# Patient Record
Sex: MALE | Race: WHITE | Employment: OTHER | URBAN - METROPOLITAN AREA
[De-identification: names, ages, dates, MRNs, and addresses within clinical notes are randomized per-mention and may not be internally consistent; named-entity substitution may affect disease eponyms.]

---

## 2018-09-26 ENCOUNTER — OFFICE VISIT (OUTPATIENT)
Dept: FAMILY MEDICINE CLINIC | Age: 53
End: 2018-09-26

## 2018-09-26 VITALS
RESPIRATION RATE: 18 BRPM | DIASTOLIC BLOOD PRESSURE: 78 MMHG | SYSTOLIC BLOOD PRESSURE: 116 MMHG | OXYGEN SATURATION: 97 % | HEART RATE: 79 BPM | TEMPERATURE: 98.2 F | HEIGHT: 75 IN | BODY MASS INDEX: 31.08 KG/M2 | WEIGHT: 250 LBS

## 2018-09-26 DIAGNOSIS — L30.9 DERMATITIS: Primary | ICD-10-CM

## 2018-09-26 PROBLEM — M54.50 LOWER BACK PAIN: Status: ACTIVE | Noted: 2018-09-26

## 2018-09-26 RX ORDER — TRIAMCINOLONE ACETONIDE 1 MG/G
CREAM TOPICAL 3 TIMES DAILY
Qty: 60 G | Refills: 1 | Status: SHIPPED | OUTPATIENT
Start: 2018-09-26 | End: 2019-12-23

## 2018-09-26 RX ORDER — DICLOFENAC SODIUM 75 MG/1
TABLET, DELAYED RELEASE ORAL
COMMUNITY
Start: 2018-08-25 | End: 2019-12-23

## 2018-09-26 NOTE — PROGRESS NOTES
9/26/2018   Chief Complaint   Patient presents with    Rash     legs, arms, and head. Patient states that it itches continuously       HPI:  Omie Apley is a 52 y.o. male who presents to clinic today for rash. Symptoms began 3 weeks ago and duration has been constant. The patient characterizes pain as itching. Associated symptoms include nothing. Aggravating factors include nothing. Patient has tried benadryl cream for relief with some success intially, now not touching the itchiness. Hx of something similar a few years ago, saw a dermatologist who gave a steroid cream  Which worked very well (trimacinolone)    Patients past medical, surgical and family histories were reviewed. Allergies and Medications reviewed and updated. Review of Systems -   General ROS: negative for - chills or fever  Respiratory ROS: negative for - cough, shortness of breath or wheezing  Cardiovascular ROS: negative for - chest pain or palpitations  Gastrointestinal ROS: negative for - abdominal pain, constipation, diarrhea, nausea, vomiting  Neurological ROS: negative for - dizziness or headaches  Dermatological ROS: negative for - rash or skin lesion changes      Vitals:  Vitals:    09/26/18 1114   BP: 116/78   Pulse: 79   Resp: 18   Temp: 98.2 °F (36.8 °C)   TempSrc: Oral   SpO2: 97%   Weight: 250 lb (113.4 kg)   Height: 6' 3\" (1.905 m)     Body mass index is 31.25 kg/(m^2). Objective  General: Patient alert and oriented and in NAD  HEENT: PER/EOMI, no conjunctival pallor or scleral icterus. No thyromegaly or cervical lymphadenopathy  Heart: Regular rate and rhythm, No murmurs, rubs or gallops.    Lungs: Clear to auscultation bilaterally, no wheezing, rales or rhonchi  Abd: +BS, non-tender, non-distended  Ext: No edema  Skin: Diffuse maculopapular rash, with occasional pustule snote don anterior and posterior thighs b/l, upper buttocks, and scalp  Neuro: AAOx3  Psych: Appropriate mood and affect    No results found for this or any previous visit (from the past 24 hour(s)). Assessment and Plan:  1. Dermatitis    Likley contact or irritant dermatitis, will treat with steroid cream, if no improvement in 1-2 weeks, patient should call office. Advised on avoiding use of steroid cream on face    - triamcinolone acetonide (KENALOG) 0.1 % topical cream; Apply  to affected area three (3) times daily. use thin layer  Dispense: 60 g; Refill: 1    I have discussed the diagnosis with the patient and the intended plan as seen in the above orders. he has expressed understanding. The patient has received an after-visit summary and questions were answered concerning future plans. I have discussed medication side effects and warnings with the patient as well.     Follow-up Disposition: Not on File    Electronically Signed: Mendel Frisk, MD

## 2018-09-27 NOTE — PROGRESS NOTES
I have reviewed the notes, assessments, and/or procedures performed by Dr. Cullen Elliott, I concur with her/his documentation of Ciara Peres.

## 2019-12-20 NOTE — PROGRESS NOTES
Jake Patrick  62 y.o. male  1965  [id number]    [facility]  Progress Note     Encounter Date: 12/23/2019    Assessment and Plan:     Encounter Diagnoses     ICD-10-CM ICD-9-CM   1. Greater trochanteric bursitis of left hip M70.62 726.5       1. Greater trochanteric bursitis of left hip  Advised NSAIDs and exercises. If not improvement in 2 weeks, RTC for possible bursa injection. - meloxicam (MOBIC) 15 mg tablet; Take 1 Tab by mouth daily. Dispense: 30 Tab; Refill: 1      I have discussed the diagnosis with the patient and the intended plan as seen in the above orders. he has expressed understanding. The patient has received an after-visit summary and questions were answered concerning future plans. I have discussed medication side effects and warnings with the patient as well. Electronically Signed: Lizbet Russell MD    Current Medications after this visit     Current Outpatient Medications   Medication Sig    meloxicam (MOBIC) 15 mg tablet Take 1 Tab by mouth daily. No current facility-administered medications for this visit. Medications Discontinued During This Encounter   Medication Reason    diclofenac EC (VOLTAREN) 75 mg EC tablet Not A Current Medication    triamcinolone acetonide (KENALOG) 0.1 % topical cream Not A Current Medication    naproxen (NAPROSYN) 500 mg tablet Not A Current Medication    ibuprofen (ADVIL) 200 mg tablet Not A Current Medication     ~~~~~~~~~~~~~~~~~~~~~~~~~~~~~~~~~~~~~~~~~~~~~~    Chief Complaint   Patient presents with    Hip Pain     Pt states that for x2 mos; Left hip has been ongoing       History provided by patient  History of Present Illness   Jake Patrick is a 62 y.o. male who presents to clinic today for:    Left hip pain  Patient present with cc of left hip pain > 2 months. Pain is on the side of the left thigh radiating downwards.  Associated with sitting for long periods and getting in and out of his care. Patient reports that he had gone to [facility] in October for the pain and had been prescribed a steroid pack. He had good response initially. Has been using advil or motrin with good response. Health Maintenance  Health Maintenance Due   Topic Date Due    Hepatitis C Screening  1965    Pneumococcal 0-64 years (1 of 1 - PPSV23) 04/10/1969    DTaP/Tdap/Td series (1 - Tdap) 04/10/1974    Shingrix Vaccine Age 50> (1 of 2) 04/10/2013    FOBT Q 1 YEAR AGE 50-75  04/10/2013     Review of Systems   Review of Systems   Constitutional:        See HPI for pertinent review of systems       Vitals/Objective:     Vitals:    12/23/19 1436   BP: 127/87   Pulse: 78   Resp: 16   Temp: 97.8 °F (36.6 °C)   TempSrc: Oral   SpO2: 97%   Weight: 256 lb (116.1 kg)   Height: 6' 3\" (1.905 m)     Body mass index is 32 kg/m². Wt Readings from Last 3 Encounters:   12/23/19 256 lb (116.1 kg)   09/26/18 250 lb (113.4 kg)   04/15/11 225 lb (102.1 kg)       Physical Exam  Constitutional:       General: He is not in acute distress. Appearance: Normal appearance. He is well-developed. He is not diaphoretic. HENT:      Head: Normocephalic and atraumatic. Right Ear: External ear normal.      Left Ear: External ear normal.      Mouth/Throat:      Pharynx: No oropharyngeal exudate or posterior oropharyngeal erythema. Eyes:      General:         Right eye: No discharge. Left eye: No discharge. Conjunctiva/sclera: Conjunctivae normal.   Cardiovascular:      Rate and Rhythm: Normal rate and regular rhythm. Heart sounds: S1 normal and S2 normal. No murmur. Pulmonary:      Effort: Pulmonary effort is normal.      Breath sounds: Normal breath sounds. No rales. Musculoskeletal:      Right lower leg: No edema. Left lower leg: No edema. Skin:     General: Skin is warm and dry. Neurological:      Mental Status: He is alert and oriented to person, place, and time.       MSK - Hip left: Deformity: None    ROM:     Flexion: Normal    Extension: Normal     Internal/external rotation: Normal      Gait: Normal       Palpation:    L1-L5: No tenderness    Sacrum: No tenderness    Coccyx: No tenderness    Left Paraspinal: No tenderness    Right Paraspinal: No tenderness    Greater trochanter: + tenderness    Ischial Tuberosity: No tenderness    Piriformis: No tenderness       Strength (0-5/5)   Hip Flexion:  Left: 5/5 Right: 5/5   Hip Extension: Left: 5/5 Right: 5/5   Hip Abduction: Left: 5/5 Right: 5/5   Hip Adduction: Left: 5/5 Right: 5/5   Knee Extension: Left: 5/5 Right: 5/5   Knee Flexion:  Left: 5/5 Right: 5/5        Sensation: Intact, no deficits      DTR:   Patella: Left: +2 Right: +2   Achilles:  Left: +2 Right: +2     Special test:   Straight leg: Left: Negative  Right: Negative   Ariannas: Left: Negative  Right: Negative   Piriformis: Left: Positive  Right: Positive          No results found for this or any previous visit (from the past 24 hour(s)). Disposition     Follow-up and Dispositions  ·   Return if symptoms worsen or fail to improve. No future appointments. History   Patient's past medical, surgical and family histories were reviewed and updated.     Past Medical History:   Diagnosis Date    H/O hernia repair     Keratoacanthoma      Past Surgical History:   Procedure Laterality Date    HX HERNIA REPAIR      HX ORTHOPAEDIC      back surgery for ruptured disc at age 19     Family History   Problem Relation Age of Onset    Breast Cancer Mother     Coronary Artery Disease Father     Heart Attack Father      Social History     Tobacco Use    Smoking status: Current Every Day Smoker     Packs/day: 1.00     Years: 15.00     Pack years: 15.00     Types: Cigarettes    Smokeless tobacco: Never Used   Substance Use Topics    Alcohol use: No     Alcohol/week: 5.0 standard drinks     Types: 6 Cans of beer per week     Comment: only on weekends    Drug use: Yes     Types: Marijuana       Allergies     Allergies   Allergen Reactions    Dilaudid [Hydromorphone] Unknown (comments)     None noted

## 2019-12-23 ENCOUNTER — OFFICE VISIT (OUTPATIENT)
Dept: FAMILY MEDICINE CLINIC | Age: 54
End: 2019-12-23

## 2019-12-23 VITALS
BODY MASS INDEX: 31.83 KG/M2 | OXYGEN SATURATION: 97 % | TEMPERATURE: 97.8 F | SYSTOLIC BLOOD PRESSURE: 127 MMHG | HEART RATE: 78 BPM | HEIGHT: 75 IN | DIASTOLIC BLOOD PRESSURE: 87 MMHG | RESPIRATION RATE: 16 BRPM | WEIGHT: 256 LBS

## 2019-12-23 DIAGNOSIS — M70.62 GREATER TROCHANTERIC BURSITIS OF LEFT HIP: Primary | ICD-10-CM

## 2019-12-23 RX ORDER — MELOXICAM 15 MG/1
15 TABLET ORAL DAILY
Qty: 30 TAB | Refills: 1 | Status: SHIPPED | OUTPATIENT
Start: 2019-12-23

## 2019-12-23 RX ORDER — IBUPROFEN 200 MG
TABLET ORAL
COMMUNITY
End: 2019-12-23

## 2019-12-23 NOTE — PROGRESS NOTES
Identified pt with two pt identifiers(name and ). Reviewed record in preparation for visit and have obtained necessary documentation. Chief Complaint   Patient presents with    Hip Pain     Pt states that for x2 mos; Left hip has been ongoing        Health Maintenance Due   Topic    Hepatitis C Screening     Pneumococcal 0-64 years (1 of 1 - PPSV23)    DTaP/Tdap/Td series (1 - Tdap)    Shingrix Vaccine Age 50> (1 of 2)    FOBT Q 1 YEAR AGE 54-65     Influenza Age 5 to Adult    -Pt declines flu shot    Coordination of Care Questionnaire:  :   1) Have you been to an emergency room, urgent care, or hospitalized since your last visit? If yes, where when, and reason for visit? Yes [facility] for Left [facility] pain      2. Have seen or consulted any other health care provider since your last visit? If yes, where when, and reason for visit?   no        Patient is accompanied by selfI have received verbal consent from Maurilio Gaffney to discuss any/all medical information while they are present in the room.

## 2019-12-23 NOTE — PATIENT INSTRUCTIONS
Hip Bursitis: Exercises Introduction Here are some examples of exercises for you to try. The exercises may be suggested for a condition or for rehabilitation. Start each exercise slowly. Ease off the exercises if you start to have pain. You will be told when to start these exercises and which ones will work best for you. How to do the exercises Hip rotator stretch 1. Lie on your back with both knees bent and your feet flat on the floor. 2. Put the ankle of your affected leg on your opposite thigh near your knee. 3. Use your hand to gently push your knee away from your body until you feel a gentle stretch around your hip. 4. Hold the stretch for 15 to 30 seconds. 5. Repeat 2 to 4 times. 6. Repeat steps 1 through 5, but this time use your hand to gently pull your knee toward your opposite shoulder. Iliotibial band stretch 1. Lean sideways against a wall. If you are not steady on your feet, hold on to a chair or counter. 2. Stand on the leg with the affected hip, with that leg close to the wall. Then cross your other leg in front of it. 3. Let your affected hip drop out to the side of your body and against wall. Then lean away from your affected hip until you feel a stretch. 4. Hold the stretch for 15 to 30 seconds. 5. Repeat 2 to 4 times. Straight-leg raises to the outside 1. Lie on your side, with your affected hip on top. 2. Tighten the front thigh muscles of your top leg to keep your knee straight. 3. Keep your hip and your leg straight in line with the rest of your body, and keep your knee pointing forward. Do not drop your hip back. 4. Lift your top leg straight up toward the ceiling, about 12 inches off the floor. Hold for about 6 seconds, then slowly lower your leg. 5. Repeat 8 to 12 times. Clamshell 1. Lie on your side, with your affected hip on top and your head propped on a pillow. Keep your feet and knees together and your knees bent. 2. Raise your top knee, but keep your feet together. Do not let your hips roll back. Your legs should open up like a clamshell. 3. Hold for 6 seconds. 4. Slowly lower your knee back down. Rest for 10 seconds. 5. Repeat 8 to 12 times. Follow-up care is a key part of your treatment and safety. Be sure to make and go to all appointments, and call your doctor if you are having problems. It's also a good idea to know your test results and keep a list of the medicines you take. Where can you learn more? Go to http://minerva-gianni.info/. Enter E549 in the search box to learn more about \"Hip Bursitis: Exercises. \" Current as of: June 26, 2019 Content Version: 12.2 © 3449-5036 [employer]. Care instructions adapted under license by [employer] (which disclaims liability or warranty for this information). If you have questions about a medical condition or this instruction, always ask your healthcare professional. Eryn Maddox any warranty or liability for your use of this information. Trochanteric Bursitis: Exercises Introduction Here are some examples of exercises for you to try. The exercises may be suggested for a condition or for rehabilitation. Start each exercise slowly. Ease off the exercises if you start to have pain. You will be told when to start these exercises and which ones will work best for you. How to do the exercises Hamstring wall stretch 1. Lie on your back in a doorway, with your good leg through the open door. 2. Slide your affected leg up the wall to straighten your knee. You should feel a gentle stretch down the back of your leg. 1. Do not arch your back. 2. Do not bend either knee. 3. Keep one heel touching the floor and the other heel touching the wall. Do not point your toes. 3. Hold the stretch for at least 1 minute to begin. Then try to lengthen the time you hold the stretch to as long as 6 minutes. 4. Repeat 2 to 4 times. 5. If you do not have a place to do this exercise in a doorway, there is another way to do it: 6. Lie on your back, and bend the knee of your affected leg. 7. Loop a towel under the ball and toes of that foot, and hold the ends of the towel in your hands. 8. Straighten your knee, and slowly pull back on the towel. You should feel a gentle stretch down the back of your leg. 9. Hold the stretch for 15 to 30 seconds. Or even better, hold the stretch for 1 minute if you can. 10. Repeat 2 to 4 times. Straight-leg raises to the outside 1. Lie on your side, with your affected leg on top. 2. Tighten the front thigh muscles of your top leg to keep your knee straight. 3. Keep your hip and your leg straight in line with the rest of your body, and keep your knee pointing forward. Do not drop your hip back. 4. Lift your top leg straight up toward the ceiling, about 12 inches off the floor. Hold for about 6 seconds, then slowly lower your leg. 5. Repeat 8 to 12 times. Clamshell 1. Lie on your side, with your affected leg on top and your head propped on a pillow. Keep your feet and knees together and your knees bent. 2. Raise your top knee, but keep your feet together. Do not let your hips roll back. Your legs should open up like a clamshell. 3. Hold for 6 seconds. 4. Slowly lower your knee back down. Rest for 10 seconds. 5. Repeat 8 to 12 times. Standing quadriceps stretch 1. If you are not steady on your feet, hold on to a chair, counter, or wall. You can also lie on your stomach or your side to do this exercise. 2. Bend the knee of the leg you want to stretch, and reach behind you to grab the front of your foot or ankle with the hand on the same side. For example, if you are stretching your right leg, use your right hand.  
3. Keeping your knees next to each other, pull your foot toward your buttock until you feel a gentle stretch across the front of your hip and down the front of your thigh. Your knee should be pointed directly to the ground, and not out to the side. 4. Hold the stretch for 15 to 30 seconds. 5. Repeat 2 to 4 times. Piriformis stretch 1. Lie on your back with your legs straight. 2. Lift your affected leg and bend your knee. With your opposite hand, reach across your body, and then gently pull your knee toward your opposite shoulder. 3. Hold the stretch for 15 to 30 seconds. 4. Repeat 2 to 4 times. Double knee-to-chest 
 
1. Lie on your back with your knees bent and your feet flat on the floor. You can put a small pillow under your head and neck if it is more comfortable. 2. Bring both knees to your chest. 
3. Keep your lower back pressed to the floor. Hold for 15 to 30 seconds. 4. Relax, and lower your knees to the starting position. 5. Repeat 2 to 4 times. Follow-up care is a key part of your treatment and safety. Be sure to make and go to all appointments, and call your doctor if you are having problems. It's also a good idea to know your test results and keep a list of the medicines you take. Where can you learn more? Go to http://minerva-gianni.info/. Enter F455 in the search box to learn more about \"Trochanteric Bursitis: Exercises. \" Current as of: June 26, 2019 Content Version: 12.2 © 7474-6090 [employer]. Care instructions adapted under license by [employer] (which disclaims liability or warranty for this information). If you have questions about a medical condition or this instruction, always ask your healthcare professional. [address] any warranty or liability for your use of this information.

## 2020-01-06 ENCOUNTER — OFFICE VISIT (OUTPATIENT)
Dept: FAMILY MEDICINE CLINIC | Age: 55
End: 2020-01-06

## 2020-01-06 VITALS
BODY MASS INDEX: 31.61 KG/M2 | SYSTOLIC BLOOD PRESSURE: 119 MMHG | WEIGHT: 254.2 LBS | RESPIRATION RATE: 16 BRPM | TEMPERATURE: 97.5 F | HEIGHT: 75 IN | DIASTOLIC BLOOD PRESSURE: 86 MMHG | HEART RATE: 81 BPM | OXYGEN SATURATION: 97 %

## 2020-01-06 DIAGNOSIS — M70.62 TROCHANTERIC BURSITIS OF LEFT HIP: Primary | ICD-10-CM

## 2020-01-06 RX ORDER — LIDOCAINE HYDROCHLORIDE 10 MG/ML
1 INJECTION, SOLUTION EPIDURAL; INFILTRATION; INTRACAUDAL; PERINEURAL ONCE
Qty: 1 ML | Refills: 0
Start: 2020-01-06 | End: 2020-01-06

## 2020-01-06 RX ORDER — TRIAMCINOLONE ACETONIDE 40 MG/ML
40 INJECTION, SUSPENSION INTRA-ARTICULAR; INTRAMUSCULAR ONCE
Qty: 1 ML | Refills: 0
Start: 2020-01-06 | End: 2020-01-06

## 2020-01-06 NOTE — PROGRESS NOTES
Identified pt with two pt identifiers(name and ). Reviewed record in preparation for visit and have obtained necessary documentation. Chief Complaint   Patient presents with    Labs     f/u        Health Maintenance Due   Topic    Hepatitis C Screening     Pneumococcal 0-64 years (1 of 1 - PPSV23)    DTaP/Tdap/Td series (1 - Tdap)    Shingrix Vaccine Age 50> (1 of 2)    FOBT Q 1 YEAR AGE 50-75        Coordination of Care Questionnaire:  :   1) Have you been to an emergency room, urgent care, or hospitalized since your last visit? If yes, where when, and reason for visit? no      2. Have seen or consulted any other health care provider since your last visit? If yes, where when, and reason for visit?  no        Patient is accompanied by self I have received verbal consent from Tacho Peña to discuss any/all medical information while they are present in the room.

## 2020-01-06 NOTE — PATIENT INSTRUCTIONS
Learning About a Hip Bursa Injection  What is a hip bursa injection? A bursa is a small sac of fluid that cushions an area between tendons and bones. The bursa on the outer side of the hip bone is called the trochanteric (say \"TDFD-[id number]-UEPW-ik\") bursa. Sometimes it can become swollen and painful. This condition is called bursitis. An injection into the bursa is a shot of medicine to reduce pain and swelling. The medicines may include pain relievers and steroid medicines. A steroid shot can sometimes help with short-term pain relief when other treatments haven't worked. How is a hip bursa injection done? First the area over the bursa will be cleaned. Your doctor may use a tiny needle to numb the skin over the area where you will get the injection. If a tiny needle is used to numb the area, your doctor will use another needle to inject the medicine. Your doctor may use a pain reliever, a steroid, or both. You may feel some pressure or discomfort. The procedure takes 10 to 30 minutes. But the shot itself usually takes only a few minutes. Your doctor may put ice on the area before you go home. You will probably go home shortly after your shot. What can you expect after the injection? You may have numbness over your hip for a few hours. If your shot included both a pain reliever and a steroid, the pain will probably go away right away. But it might come back after a few hours. This might happen if the pain reliever wears off and the steroid has not started to work yet. The steroid medicine generally takes a few days to work. If the pain comes back, you can put ice or a cold pack on your hip for 10 to 20 minutes at a time. Put a thin cloth between the ice and your skin. Follow your doctor's instructions carefully. Avoid strenuous activities on the day you get the shot, especially those that put stress on your hip. Steroids don't always work.  But when they do, the pain relief can last for several days to a few months or longer. Follow-up care is a key part of your treatment and safety. Be sure to make and go to all appointments, and call your doctor if you are having problems. It's also a good idea to know your test results and keep a list of the medicines you take. Where can you learn more? Go to http://minerva-gianni.info/. Enter W101 in the search box to learn more about \"Learning About a Hip Bursa Injection. \"  Current as of: June 26, 2019  Content Version: 12.2  © 1854-9092 [employer]. Care instructions adapted under license by [employer] (which disclaims liability or warranty for this information). If you have questions about a medical condition or this instruction, always ask your healthcare professional. [address] any warranty or liability for your use of this information.

## 2020-01-06 NOTE — PROGRESS NOTES
[facility]  OFFICE PROCEDURE PROGRESS NOTE        Chart reviewed for the following:   Qamar Ortiz MD, have reviewed the History, Physical and updated the Allergic reactions for [address] performed immediately prior to start of procedure:   Qamar Ortiz MD, have performed the following reviews on Jazmin Gutierrez prior to the start of the procedure:            * Patient was identified by name and date of birth   * Agreement on procedure being performed was verified  * Risks and Benefits explained to the patient  * Procedure site verified and marked as necessary  * Patient was positioned for comfort  * Consent was signed and verified     Time: 8:45 am      Date of procedure: 1/6/2020    Procedure performed by:  Cari Moseley MD    Provider assisted by: none    Patient assisted by: self    How tolerated by patient: tolerated the procedure well with no complications    Post Procedural Pain Scale: 2 - Hurts Little Bit    Comments: none        Procedure note:  After consent was obtained and a timeout to verify accuracy the left greater trochanteric bursa was prepped with alcohol and 3 swabs of betadine. Using sterile no-touch technique the joint space was entered and no blood returned. One cc of 2% plain xylocaine and 1 cc of Kenalog (40 mg) were mixed and injected. The procedure was well tolerated and the patient got immediate relief from pain. It was explained that pain may actually increase the day after the injection until the steroid takes effect. The patient knows to rest the bursa for 2 days and call immediately if fever, redness or swelling of the joint occurs. Joint injections improve symptoms most but not all of the time.  Call back if symptoms persist.    Cari Moseley MD

## 2020-02-17 ENCOUNTER — TELEPHONE (OUTPATIENT)
Dept: FAMILY MEDICINE CLINIC | Age: 55
End: 2020-02-17

## 2020-02-17 DIAGNOSIS — M70.62 TROCHANTERIC BURSITIS OF LEFT HIP: Primary | ICD-10-CM

## 2020-02-17 NOTE — TELEPHONE ENCOUNTER
----- Message from Claudia Woods sent at 2/17/2020  3:14 PM EST -----  Regarding: /Telephone  General Message/Vendor Calls    Caller's first and last name:      Reason for call:  Referral to see someone about hip pain    Callback required yes/no and why:  Yes, to let him know when it has been done     Best contact number(s):  [phone]    Details to clarify the request:      Claudia Woods